# Patient Record
Sex: MALE | Race: WHITE | HISPANIC OR LATINO | Employment: FULL TIME | URBAN - METROPOLITAN AREA
[De-identification: names, ages, dates, MRNs, and addresses within clinical notes are randomized per-mention and may not be internally consistent; named-entity substitution may affect disease eponyms.]

---

## 2019-04-01 ENCOUNTER — HOSPITAL ENCOUNTER (OUTPATIENT)
Dept: URGENT CARE | Facility: CLINIC | Age: 19
Discharge: HOME | End: 2019-04-01
Attending: NURSE PRACTITIONER

## 2019-04-03 LAB — BACTERIA SPEC AEROBE CULT: NORMAL

## 2019-07-24 ENCOUNTER — HOSPITAL ENCOUNTER (OUTPATIENT)
Dept: LAB | Facility: HOSPITAL | Age: 19
Discharge: HOME | End: 2019-07-24

## 2019-07-25 LAB — HBV SURFACE AB SER QL: REACTIVE

## 2020-03-23 ENCOUNTER — HOSPITAL ENCOUNTER (OUTPATIENT)
Dept: MRI IMAGING | Facility: HOSPITAL | Age: 20
Discharge: HOME | End: 2020-03-23
Attending: OPTOMETRIST

## 2020-11-27 ENCOUNTER — HOSPITAL ENCOUNTER (OUTPATIENT)
Dept: OTHER | Facility: HOSPITAL | Age: 20
Discharge: HOME | End: 2020-11-27

## 2020-11-27 LAB
ALBUMIN SERPL-MCNC: 4.9 G/DL (ref 3.5–5)
ALBUMIN/GLOB SERPL: 1.6 {RATIO} (ref 1.4–2.6)
ALP SERPL-CCNC: 135 U/L (ref 53–128)
ALT SERPL-CCNC: 16 U/L (ref 10–40)
ANION GAP SERPL CALC-SCNC: 17 MMOL/L (ref 8–19)
AST SERPL-CCNC: 19 U/L (ref 15–50)
BASOPHILS # BLD AUTO: 0.02 10*3/UL (ref 0–0.2)
BASOPHILS NFR BLD AUTO: 0.5 % (ref 0–3)
BILIRUB SERPL-MCNC: 0.83 MG/DL (ref 0.2–1.3)
BUN SERPL-MCNC: 17 MG/DL (ref 5–25)
BUN/CREAT SERPL: 16 {RATIO} (ref 6–20)
CALCIUM SERPL-MCNC: 10.3 MG/DL (ref 8.7–10.4)
CHLORIDE SERPL-SCNC: 102 MMOL/L (ref 99–111)
CHOLEST SERPL-MCNC: 182 MG/DL (ref 107–200)
CHOLEST/HDLC SERPL: 2.6 {RATIO} (ref 3–6)
CONV ABS IMM GRAN: 0.01 10*3/UL (ref 0–0.2)
CONV CO2: 25 MMOL/L (ref 22–32)
CONV IMMATURE GRAN: 0.2 % (ref 0–1.8)
CONV TOTAL PROTEIN: 8 G/DL (ref 6.3–8.2)
CREAT UR-MCNC: 1.09 MG/DL (ref 0.7–1.2)
DEPRECATED RDW RBC AUTO: 40.5 FL (ref 35.1–43.9)
EOSINOPHIL # BLD AUTO: 0.06 10*3/UL (ref 0–0.7)
EOSINOPHIL # BLD AUTO: 1.5 % (ref 0–7)
ERYTHROCYTE [DISTWIDTH] IN BLOOD BY AUTOMATED COUNT: 11.9 % (ref 11.6–14.4)
EST. AVERAGE GLUCOSE BLD GHB EST-MCNC: 111 MG/DL
GFR SERPLBLD BASED ON 1.73 SQ M-ARVRAT: >60 ML/MIN/{1.73_M2}
GLOBULIN UR ELPH-MCNC: 3.1 G/DL (ref 2–3.5)
GLUCOSE SERPL-MCNC: 101 MG/DL (ref 70–99)
HBA1C MFR BLD: 5.5 % (ref 3.5–5.7)
HCT VFR BLD AUTO: 47.2 % (ref 42–52)
HDLC SERPL-MCNC: 71 MG/DL (ref 40–60)
HGB BLD-MCNC: 16.1 G/DL (ref 14–18)
LDLC SERPL CALC-MCNC: 98 MG/DL (ref 70–100)
LYMPHOCYTES # BLD AUTO: 1.51 10*3/UL (ref 1–5)
LYMPHOCYTES NFR BLD AUTO: 37.4 % (ref 20–45)
MCH RBC QN AUTO: 31.9 PG (ref 27–31)
MCHC RBC AUTO-ENTMCNC: 34.1 G/DL (ref 33–37)
MCV RBC AUTO: 93.5 FL (ref 80–96)
MONOCYTES # BLD AUTO: 0.28 10*3/UL (ref 0.2–1.2)
MONOCYTES NFR BLD AUTO: 6.9 % (ref 3–10)
NEUTROPHILS # BLD AUTO: 2.16 10*3/UL (ref 2–8)
NEUTROPHILS NFR BLD AUTO: 53.5 % (ref 30–85)
NRBC CBCN: 0 % (ref 0–0.7)
OSMOLALITY SERPL CALC.SUM OF ELEC: 290 MOSM/KG (ref 273–304)
PLATELET # BLD AUTO: 175 10*3/UL (ref 130–400)
PMV BLD AUTO: 11.3 FL (ref 9.4–12.4)
POTASSIUM SERPL-SCNC: 4.5 MMOL/L (ref 3.5–5.3)
RBC # BLD AUTO: 5.05 10*6/UL (ref 4.7–6.1)
SODIUM SERPL-SCNC: 139 MMOL/L (ref 135–147)
TRIGL SERPL-MCNC: 64 MG/DL (ref 40–150)
TSH SERPL-ACNC: 1.7 M[IU]/L (ref 0.27–4.2)
VLDLC SERPL-MCNC: 13 MG/DL (ref 5–37)
WBC # BLD AUTO: 4.04 10*3/UL (ref 4.8–10.8)

## 2020-12-26 ENCOUNTER — HOSPITAL ENCOUNTER (OUTPATIENT)
Dept: OTHER | Facility: HOSPITAL | Age: 20
Discharge: HOME | End: 2020-12-26
Attending: INTERNAL MEDICINE

## 2021-01-22 ENCOUNTER — HOSPITAL ENCOUNTER (OUTPATIENT)
Dept: OTHER | Facility: HOSPITAL | Age: 21
Discharge: HOME | End: 2021-01-22
Attending: INTERNAL MEDICINE

## 2021-11-10 ENCOUNTER — HOSPITAL ENCOUNTER (EMERGENCY)
Facility: HOSPITAL | Age: 21
Discharge: HOME | End: 2021-11-10
Attending: EMERGENCY MEDICINE | Admitting: EMERGENCY MEDICINE

## 2021-11-10 ENCOUNTER — APPOINTMENT (OUTPATIENT)
Dept: CT IMAGING | Facility: HOSPITAL | Age: 21
End: 2021-11-10

## 2021-11-10 VITALS
TEMPERATURE: 97.9 F | RESPIRATION RATE: 18 BRPM | OXYGEN SATURATION: 98 % | HEIGHT: 71 IN | SYSTOLIC BLOOD PRESSURE: 114 MMHG | BODY MASS INDEX: 27.01 KG/M2 | DIASTOLIC BLOOD PRESSURE: 69 MMHG | HEART RATE: 68 BPM | WEIGHT: 192.9 LBS

## 2021-11-10 DIAGNOSIS — N20.1 RIGHT URETERAL STONE: Primary | ICD-10-CM

## 2021-11-10 DIAGNOSIS — R10.9 RIGHT FLANK PAIN: ICD-10-CM

## 2021-11-10 DIAGNOSIS — R73.9 ELEVATED BLOOD SUGAR LEVEL: ICD-10-CM

## 2021-11-10 DIAGNOSIS — R93.5 ABNORMAL CT OF THE ABDOMEN: ICD-10-CM

## 2021-11-10 DIAGNOSIS — N23 RENAL COLIC: ICD-10-CM

## 2021-11-10 DIAGNOSIS — R11.2 NON-INTRACTABLE VOMITING WITH NAUSEA: ICD-10-CM

## 2021-11-10 LAB
ALBUMIN SERPL-MCNC: 4.6 G/DL (ref 3.5–5.2)
ALBUMIN/GLOB SERPL: 1.8 G/DL
ALP SERPL-CCNC: 136 U/L (ref 39–117)
ALT SERPL W P-5'-P-CCNC: 14 U/L (ref 1–41)
ANION GAP SERPL CALCULATED.3IONS-SCNC: 12.8 MMOL/L (ref 5–15)
AST SERPL-CCNC: 15 U/L (ref 1–40)
BACTERIA UR QL AUTO: ABNORMAL /HPF
BASOPHILS # BLD AUTO: 0.02 10*3/MM3 (ref 0–0.2)
BASOPHILS NFR BLD AUTO: 0.2 % (ref 0–1.5)
BILIRUB SERPL-MCNC: 0.7 MG/DL (ref 0–1.2)
BILIRUB UR QL STRIP: ABNORMAL
BUN SERPL-MCNC: 17 MG/DL (ref 6–20)
BUN/CREAT SERPL: 15.3 (ref 7–25)
CALCIUM SPEC-SCNC: 8.9 MG/DL (ref 8.6–10.5)
CHLORIDE SERPL-SCNC: 101 MMOL/L (ref 98–107)
CLARITY UR: ABNORMAL
CO2 SERPL-SCNC: 21.2 MMOL/L (ref 22–29)
COLOR UR: ABNORMAL
CREAT SERPL-MCNC: 1.11 MG/DL (ref 0.76–1.27)
DEPRECATED RDW RBC AUTO: 38.9 FL (ref 37–54)
EOSINOPHIL # BLD AUTO: 0.02 10*3/MM3 (ref 0–0.4)
EOSINOPHIL NFR BLD AUTO: 0.2 % (ref 0.3–6.2)
ERYTHROCYTE [DISTWIDTH] IN BLOOD BY AUTOMATED COUNT: 11.8 % (ref 12.3–15.4)
GFR SERPL CREATININE-BSD FRML MDRD: 83 ML/MIN/1.73
GLOBULIN UR ELPH-MCNC: 2.5 GM/DL
GLUCOSE SERPL-MCNC: 142 MG/DL (ref 65–99)
GLUCOSE UR STRIP-MCNC: ABNORMAL MG/DL
HCT VFR BLD AUTO: 41.7 % (ref 37.5–51)
HGB BLD-MCNC: 14.8 G/DL (ref 13–17.7)
HGB UR QL STRIP.AUTO: ABNORMAL
HOLD SPECIMEN: NORMAL
HOLD SPECIMEN: NORMAL
IMM GRANULOCYTES # BLD AUTO: 0.03 10*3/MM3 (ref 0–0.05)
IMM GRANULOCYTES NFR BLD AUTO: 0.3 % (ref 0–0.5)
KETONES UR QL STRIP: NEGATIVE
LEUKOCYTE ESTERASE UR QL STRIP.AUTO: ABNORMAL
LIPASE SERPL-CCNC: 15 U/L (ref 13–60)
LYMPHOCYTES # BLD AUTO: 1.15 10*3/MM3 (ref 0.7–3.1)
LYMPHOCYTES NFR BLD AUTO: 13 % (ref 19.6–45.3)
MCH RBC QN AUTO: 32.4 PG (ref 26.6–33)
MCHC RBC AUTO-ENTMCNC: 35.5 G/DL (ref 31.5–35.7)
MCV RBC AUTO: 91.2 FL (ref 79–97)
MONOCYTES # BLD AUTO: 0.44 10*3/MM3 (ref 0.1–0.9)
MONOCYTES NFR BLD AUTO: 5 % (ref 5–12)
NEUTROPHILS NFR BLD AUTO: 7.19 10*3/MM3 (ref 1.7–7)
NEUTROPHILS NFR BLD AUTO: 81.3 % (ref 42.7–76)
NITRITE UR QL STRIP: NEGATIVE
NRBC BLD AUTO-RTO: 0 /100 WBC (ref 0–0.2)
PH UR STRIP.AUTO: 5.5 [PH] (ref 5–8)
PLATELET # BLD AUTO: 156 10*3/MM3 (ref 140–450)
PMV BLD AUTO: 11.2 FL (ref 6–12)
POTASSIUM SERPL-SCNC: 3.9 MMOL/L (ref 3.5–5.2)
PROT SERPL-MCNC: 7.1 G/DL (ref 6–8.5)
PROT UR QL STRIP: ABNORMAL
RBC # BLD AUTO: 4.57 10*6/MM3 (ref 4.14–5.8)
RBC # UR: ABNORMAL /HPF
REF LAB TEST METHOD: ABNORMAL
SODIUM SERPL-SCNC: 135 MMOL/L (ref 136–145)
SP GR UR STRIP: >=1.03 (ref 1–1.03)
SQUAMOUS #/AREA URNS HPF: ABNORMAL /HPF
UROBILINOGEN UR QL STRIP: ABNORMAL
WBC # BLD AUTO: 8.85 10*3/MM3 (ref 3.4–10.8)
WBC UR QL AUTO: ABNORMAL /HPF
WHOLE BLOOD HOLD SPECIMEN: NORMAL
WHOLE BLOOD HOLD SPECIMEN: NORMAL

## 2021-11-10 PROCEDURE — 25010000002 KETOROLAC TROMETHAMINE PER 15 MG: Performed by: NURSE PRACTITIONER

## 2021-11-10 PROCEDURE — 85025 COMPLETE CBC W/AUTO DIFF WBC: CPT | Performed by: EMERGENCY MEDICINE

## 2021-11-10 PROCEDURE — 81001 URINALYSIS AUTO W/SCOPE: CPT | Performed by: EMERGENCY MEDICINE

## 2021-11-10 PROCEDURE — 80053 COMPREHEN METABOLIC PANEL: CPT | Performed by: EMERGENCY MEDICINE

## 2021-11-10 PROCEDURE — 99283 EMERGENCY DEPT VISIT LOW MDM: CPT

## 2021-11-10 PROCEDURE — 74176 CT ABD & PELVIS W/O CONTRAST: CPT

## 2021-11-10 PROCEDURE — 96374 THER/PROPH/DIAG INJ IV PUSH: CPT

## 2021-11-10 PROCEDURE — 83690 ASSAY OF LIPASE: CPT | Performed by: EMERGENCY MEDICINE

## 2021-11-10 PROCEDURE — 25010000002 ONDANSETRON PER 1 MG: Performed by: NURSE PRACTITIONER

## 2021-11-10 PROCEDURE — 96375 TX/PRO/DX INJ NEW DRUG ADDON: CPT

## 2021-11-10 RX ORDER — ONDANSETRON 2 MG/ML
4 INJECTION INTRAMUSCULAR; INTRAVENOUS ONCE
Status: COMPLETED | OUTPATIENT
Start: 2021-11-10 | End: 2021-11-10

## 2021-11-10 RX ORDER — TAMSULOSIN HYDROCHLORIDE 0.4 MG/1
1 CAPSULE ORAL DAILY
Qty: 7 CAPSULE | Refills: 0 | Status: SHIPPED | OUTPATIENT
Start: 2021-11-10

## 2021-11-10 RX ORDER — KETOROLAC TROMETHAMINE 15 MG/ML
15 INJECTION, SOLUTION INTRAMUSCULAR; INTRAVENOUS ONCE
Status: COMPLETED | OUTPATIENT
Start: 2021-11-10 | End: 2021-11-10

## 2021-11-10 RX ORDER — ONDANSETRON 4 MG/1
4 TABLET, ORALLY DISINTEGRATING ORAL EVERY 8 HOURS PRN
Qty: 15 TABLET | Refills: 0 | Status: SHIPPED | OUTPATIENT
Start: 2021-11-10

## 2021-11-10 RX ORDER — SODIUM CHLORIDE 0.9 % (FLUSH) 0.9 %
10 SYRINGE (ML) INJECTION AS NEEDED
Status: DISCONTINUED | OUTPATIENT
Start: 2021-11-10 | End: 2021-11-10 | Stop reason: HOSPADM

## 2021-11-10 RX ORDER — HYDROCODONE BITARTRATE AND ACETAMINOPHEN 5; 325 MG/1; MG/1
1 TABLET ORAL EVERY 6 HOURS PRN
Qty: 10 TABLET | Refills: 0 | Status: SHIPPED | OUTPATIENT
Start: 2021-11-10

## 2021-11-10 RX ADMIN — ONDANSETRON 4 MG: 2 INJECTION INTRAMUSCULAR; INTRAVENOUS at 08:05

## 2021-11-10 RX ADMIN — KETOROLAC TROMETHAMINE 15 MG: 15 INJECTION, SOLUTION INTRAMUSCULAR; INTRAVENOUS at 08:05

## 2021-11-10 NOTE — ED PROVIDER NOTES
Emergency Department Encounter    Room number: 34/34  Date seen: 11/10/2021  PCP: Provider, No Known      History provided by:  Patient   used: No          HPI:  Chief complaint: right flank pain    Context: Lobo Eli is a 20 y.o. male with a history of kidney stones with lithotripsy (at [facility]) who presents to the ED with right flank pain which started abruptly at 4 AM.  Patient has had nausea, vomiting, and blood in urine. Pain occasionally radiates to right abdomen. He denies fever, cough, chest pain, shortness of breath, abdominal pain, abdominal swelling, testicular pain or swelling, penile rashes or discharge, loss of bowel or bladder control, weakness in lower extremities, saddle paresthesias, or other complaint.  Patient states he does not have a urologist.  Patient denies smoking or alcohol use.      Location: right flank pain  Quality: sharp  Severity: moderate to severe  Radiation: right abdomen  Duration: constant  Onset: 4 am  Modifying factors: nausea, vomiting, blood in urine        Old records reviewed:  No recent ED visits or imaging.     Triage Vitals:  ED Triage Vitals [11/10/21 0713]   Temp Heart Rate Resp BP SpO2   97.9 °F (36.6 °C) 68 18 114/69 98 %      Temp src Heart Rate Source Patient Position BP Location FiO2 (%)   Oral Monitor Sitting Right arm --         Review of Systems   Constitutional: Negative for fatigue and fever.   HENT: Negative for rhinorrhea and sore throat.    Respiratory: Negative for cough and shortness of breath.    Cardiovascular: Negative for chest pain and palpitations.   Gastrointestinal: Positive for nausea and vomiting. Negative for abdominal distention, abdominal pain and diarrhea.   Genitourinary: Positive for hematuria. Negative for dysuria, flank pain, penile discharge, penile pain, scrotal swelling and testicular pain.   Musculoskeletal: Negative for arthralgias and myalgias.   Skin: Negative for rash and wound.   Neurological:  Negative for dizziness and headaches.   Psychiatric/Behavioral: Negative for sleep disturbance and suicidal ideas.         Physical Exam  Constitutional:       Comments: Appears uncomfortable   HENT:      Head: Normocephalic and atraumatic.      Nose: Nose normal.      Mouth/Throat:      Mouth: Mucous membranes are moist.      Pharynx: Oropharynx is clear.   Eyes:      Extraocular Movements: Extraocular movements intact.      Pupils: Pupils are equal, round, and reactive to light.   Cardiovascular:      Rate and Rhythm: Normal rate and regular rhythm.   Pulmonary:      Effort: Pulmonary effort is normal.      Breath sounds: Normal breath sounds.   Abdominal:      General: Abdomen is flat. Bowel sounds are normal. There is no distension.      Palpations: Abdomen is soft.      Tenderness: There is no abdominal tenderness.      Comments: Positive right flank pain, no rashes or wounds.    Musculoskeletal:         General: Normal range of motion.   Skin:     General: Skin is warm and dry.      Coloration: Skin is pale.   Neurological:      General: No focal deficit present.      Mental Status: He is alert and oriented to person, place, and time.   Psychiatric:         Mood and Affect: Mood normal.         Behavior: Behavior normal.         Thought Content: Thought content normal.         Judgment: Judgment normal.             Allergies:  Patient has no known allergies.  Patient's allergies reviewed    Past Medical History:  History reviewed. No pertinent past medical history.      Past Surgical History:  History reviewed. No pertinent surgical history.    Procedures    Labs Reviewed   COMPREHENSIVE METABOLIC PANEL - Abnormal; Notable for the following components:       Result Value    Glucose 142 (*)     Sodium 135 (*)     CO2 21.2 (*)     Alkaline Phosphatase 136 (*)     All other components within normal limits    Narrative:     GFR Normal >60  Chronic Kidney Disease <60  Kidney Failure <15     URINALYSIS W/  MICROSCOPIC IF INDICATED (NO CULTURE) - Abnormal; Notable for the following components:    Color, UA Orange (*)     Appearance, UA Turbid (*)     Bilirubin, UA Small (1+) (*)     Blood, UA Moderate (2+) (*)     Leuk Esterase, UA Small (1+) (*)     All other components within normal limits   CBC WITH AUTO DIFFERENTIAL - Abnormal; Notable for the following components:    RDW 11.8 (*)     Neutrophil % 81.3 (*)     Lymphocyte % 13.0 (*)     Eosinophil % 0.2 (*)     Neutrophils, Absolute 7.19 (*)     All other components within normal limits   URINALYSIS, MICROSCOPIC ONLY - Abnormal; Notable for the following components:    RBC, UA Too Numerous to Count (*)     WBC, UA 0-2 (*)     All other components within normal limits   LIPASE - Normal   RAINBOW DRAW    Narrative:     The following orders were created for panel order [place] Draw.  Procedure                               Abnormality         Status                     ---------                               -----------         ------                     Green Top (Gel)[[id number]]                                  Final result               Lavender Top[[id number]]                                     Final result               Gold Top - SST[[id number]]                                   Final result               Light Blue Top[[id number]]                                   Final result                 Please view results for these tests on the individual orders.   CBC AND DIFFERENTIAL    Narrative:     The following orders were created for panel order CBC & Differential.  Procedure                               Abnormality         Status                     ---------                               -----------         ------                     CBC Auto Differential[[id number]]        Abnormal            Final result                 Please view results for these tests on the individual orders.   GREEN TOP   LAVENDER TOP   GOLD TOP - SST   LIGHT BLUE TOP       CT Abdomen  Pelvis Without Contrast    Result Date: 11/10/2021  Narrative: PROCEDURE: CT ABDOMEN PELVIS WO CONTRAST  COMPARISON: [facility], CT, ABD/PELVIS STONE PROTOCOL W/O, 8/02/2009, 5:06.  INDICATIONS: GENERALIZED BILATERAL FLANK PAIN WITH GROSS HEMATURIA  TECHNIQUE: CT images were created without intravenous contrast.   PROTOCOL:   Standard imaging protocol performed    RADIATION:   DLP: 495.4mGy*cm   Automated exposure control was utilized to minimize radiation dose.  FINDINGS:  Lung bases are clear.  No upper abdominal finding.  There appears to be a duplicated or partially duplicated collecting system.  There is appears separate is difficult to be certain if they joint distally.  No significant nephrolithiasis  There is a 3 millimeter stone in the distal right ureter arising from the upper pole moiety is with minimal any obstructive uropathy.  No bladder stones are evident.  No renal lesions are seen.  No evidence for appendicitis.  No small bowel finding is seen.  Normal-appearing aorta.  No adenopathy.  No significant ascites.  No acute osseous finding.  There are few prominent lymph nodes in the right lower quadrant.  CONCLUSION:  1. 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  Two ureters arise from the right collecting system and continue to at least the mid ureter.  It is difficult to be certain if the ureters few distally. 2. 3 millimeter stone in the distal right ureter that arises from the upper pole moiety.  Very mild if any obstructive uropathy present. 3. No other acute finding.     DIEGO RIVERA MD       Electronically Signed and Approved By: DIEGO RIVERA MD on 11/10/2021 at 8:28               Medications   sodium chloride 0.9 % flush 10 mL (has no administration in time range)   ketorolac (TORADOL) injection 15 mg (15 mg Intravenous Given 11/10/21 0805)   ondansetron (ZOFRAN) injection 4 mg (4 mg Intravenous Given 11/10/21 0805)          Progress Notes:  0815 Patient rechecked. States pain is much improved now and minimal at 2/10. Discussed elevated glucose level and need for follow up. He verbalized understanding.     0850 I have personally reviewed all radiology findings, incidental and otherwise, with the patient and made patient aware of what needs to be followed up with PCP. He is aware of duplicating renal system on right    0902 I have discussed with the patient the emergency department work-up including all test results, the differential diagnosis, the diagnosis given in the emergency department, and the absolute need for follow-up with the primary care physician.  I have discussed all prescriptions and treatments.  I have discussed the possible worsening of their condition, and also discussed criteria for return to the emergency department which includes but is not limited to worsening condition or lack of improvement of the current condition.  I have informed them that a normal work-up evaluation in the emergency department and/or discharge from the emergency department, does not signify that no disease process is present, but simply that there is no emergent indication for admission.  All questions were answered at this time.  I informed them that significant pathology may still be present, but they may need further work-up, and that this further investigation should be undertaken by the primary care physician. He voiced his/her understanding.  Patient is agreeable to plan for discharge.    Discharge instructions include:   Follow up with any PCP on the list provided to establish primary care and have your blood pressure rechecked.    Follow-up with Dr. Lucero, urology within 1 week or earlier if symptoms worsen.  Call for an appointment.    Use urine strainer as directed.    Take Zofran, Flomax, and Lortab as directed.    Tab is a narcotic pain reliever only use when absolutely necessary.  Narcotics can be addictive.  Do not  "take Tylenol or alcohol or take this medication.  Do not share with friends or family      Follow up with PCP to have your sugar rechecked and possible hemoglobin AIC      Return to Emergency department for fever, pain with urination, testicular pain or swelling, unable to urinate, new change or worsening symptoms.            Vitals:    11/10/21 0713   BP: 114/69   BP Location: Right arm   Patient Position: Sitting   Pulse: 68   Resp: 18   Temp: 97.9 °F (36.6 °C)   TempSrc: Oral   SpO2: 98%   Weight: 87.5 kg (192 lb 14.4 oz)   Height: 180.3 cm (71\")           Final diagnoses:   Elevated blood sugar level   Right ureteral stone   Right flank pain   Non-intractable vomiting with nausea   Renal colic   Abnormal CT of the abdomen (duplicate renal collecting system, right)       Prescriptions:        Medication List      START taking these medications    HYDROcodone-acetaminophen 5-325 MG per tablet  Commonly known as: NORCO  Take 1 tablet by mouth Every 6 (Six) Hours As Needed for Moderate Pain .     ondansetron ODT 4 MG disintegrating tablet  Commonly known as: ZOFRAN-ODT  Place 1 tablet on the tongue Every 8 (Eight) Hours As Needed for Nausea or Vomiting.     tamsulosin 0.4 MG capsule 24 hr capsule  Commonly known as: FLOMAX  Take 1 capsule by mouth Daily. May discontinue when pass stone           Where to Get Your Medications      These medications were sent to [facility] [address] [place] [address]    Hours: Mon-Fri 9:00AM-7:30PM Saturday 9:00AM-2:00PM Phone: [phone]   · HYDROcodone-acetaminophen 5-325 MG per tablet  · ondansetron ODT 4 MG disintegrating tablet  · tamsulosin 0.4 MG capsule 24 hr capsule                 MDM  Number of Diagnoses or Management Options     Amount and/or Complexity of Data Reviewed  Clinical lab tests: ordered and reviewed  Tests in the medicine section of CPT®: ordered and reviewed  Independent visualization of images, tracings, or specimens: " yes    Risk of Complications, Morbidity, and/or Mortality  Presenting problems: moderate  Diagnostic procedures: moderate  Management options: moderate    Patient Progress  Patient progress: stable      (N20.1) Right ureteral stone - Plan: HYDROcodone-acetaminophen (NORCO) 5-325 MG per tablet    (R73.9) Elevated blood sugar level    (R10.9) Right flank pain    (R11.2) Non-intractable vomiting with nausea    (N23) Renal colic    (R93.5) Abnormal CT of the abdomen (duplicate renal collecting system, right)      Reviewing your test results and medical records in your chart is not a substitution for discussing these with your health care provider.  Please contact your primary care provider to discuss any questions or concerns you may have regarding these test results.      Part of this note may be an electronic transcription/translation of spoken language to printed text using the Dragon Dictation System.  The electronic translation of spoken language may permit erroneous or at times nonsensical words or phrases to be inadvertently transcribed.  Although I have reviewed the note for such errors some may still exist.             Tavia Travis, APRN  11/10/21 0915

## 2021-11-10 NOTE — DISCHARGE INSTRUCTIONS
Follow up with any PCP on the list provided to establish primary care and have your blood pressure rechecked.    Follow-up with Dr. Lucero, urology within 1 week or earlier if symptoms worsen.  Call for an appointment.    Use urine strainer as directed.    Take Zofran, Flomax, and Lortab as directed.    Tab is a narcotic pain reliever only use when absolutely necessary.  Narcotics can be addictive.  Do not take Tylenol or alcohol or take this medication.  Do not share with friends or family    Follow up with PCP to have your sugar rechecked and possible hemoglobin AIC    Return to Emergency department for fever, pain with urination, testicular pain or swelling, unable to urinate, new change or worsening symptoms.

## 2025-04-30 ENCOUNTER — OFFICE VISIT (OUTPATIENT)
Dept: FAMILY MEDICINE CLINIC | Facility: CLINIC | Age: 25
End: 2025-04-30
Payer: COMMERCIAL

## 2025-04-30 VITALS
HEIGHT: 71 IN | TEMPERATURE: 98.5 F | HEART RATE: 81 BPM | BODY MASS INDEX: 26.88 KG/M2 | DIASTOLIC BLOOD PRESSURE: 78 MMHG | WEIGHT: 192 LBS | OXYGEN SATURATION: 97 % | SYSTOLIC BLOOD PRESSURE: 138 MMHG

## 2025-04-30 DIAGNOSIS — R74.8 ELEVATED ALKALINE PHOSPHATASE LEVEL: ICD-10-CM

## 2025-04-30 DIAGNOSIS — E78.5 HYPERLIPIDEMIA, UNSPECIFIED HYPERLIPIDEMIA TYPE: Primary | ICD-10-CM

## 2025-04-30 DIAGNOSIS — Z11.59 NEED FOR HEPATITIS C SCREENING TEST: ICD-10-CM

## 2025-04-30 DIAGNOSIS — R73.09 HIGH GLUCOSE: ICD-10-CM

## 2025-04-30 LAB
ALBUMIN SERPL-MCNC: 4.8 G/DL (ref 3.5–5.2)
ALBUMIN/GLOB SERPL: 1.5 G/DL
ALP SERPL-CCNC: 143 U/L (ref 39–117)
ALT SERPL W P-5'-P-CCNC: 22 U/L (ref 1–41)
ANION GAP SERPL CALCULATED.3IONS-SCNC: 11.5 MMOL/L (ref 5–15)
AST SERPL-CCNC: 27 U/L (ref 1–40)
BASOPHILS # BLD AUTO: 0.02 10*3/MM3 (ref 0–0.2)
BASOPHILS NFR BLD AUTO: 0.3 % (ref 0–1.5)
BILIRUB SERPL-MCNC: 0.5 MG/DL (ref 0–1.2)
BUN SERPL-MCNC: 14 MG/DL (ref 6–20)
BUN/CREAT SERPL: 13.9 (ref 7–25)
CALCIUM SPEC-SCNC: 9.8 MG/DL (ref 8.6–10.5)
CHLORIDE SERPL-SCNC: 103 MMOL/L (ref 98–107)
CHOLEST SERPL-MCNC: 172 MG/DL (ref 0–200)
CO2 SERPL-SCNC: 25.5 MMOL/L (ref 22–29)
CREAT SERPL-MCNC: 1.01 MG/DL (ref 0.76–1.27)
DEPRECATED RDW RBC AUTO: 41.5 FL (ref 37–54)
EGFRCR SERPLBLD CKD-EPI 2021: 105.2 ML/MIN/1.73
EOSINOPHIL # BLD AUTO: 0.12 10*3/MM3 (ref 0–0.4)
EOSINOPHIL NFR BLD AUTO: 2 % (ref 0.3–6.2)
ERYTHROCYTE [DISTWIDTH] IN BLOOD BY AUTOMATED COUNT: 11.9 % (ref 12.3–15.4)
GLOBULIN UR ELPH-MCNC: 3.1 GM/DL
GLUCOSE SERPL-MCNC: 74 MG/DL (ref 65–99)
HBA1C MFR BLD: 5.1 % (ref 4.8–5.6)
HCT VFR BLD AUTO: 46.1 % (ref 37.5–51)
HCV AB SER QL: NORMAL
HDLC SERPL-MCNC: 57 MG/DL (ref 40–60)
HGB BLD-MCNC: 16 G/DL (ref 13–17.7)
IMM GRANULOCYTES # BLD AUTO: 0.02 10*3/MM3 (ref 0–0.05)
IMM GRANULOCYTES NFR BLD AUTO: 0.3 % (ref 0–0.5)
LDLC SERPL CALC-MCNC: 97 MG/DL (ref 0–100)
LDLC/HDLC SERPL: 1.67 {RATIO}
LYMPHOCYTES # BLD AUTO: 1.91 10*3/MM3 (ref 0.7–3.1)
LYMPHOCYTES NFR BLD AUTO: 31.5 % (ref 19.6–45.3)
MCH RBC QN AUTO: 32.6 PG (ref 26.6–33)
MCHC RBC AUTO-ENTMCNC: 34.7 G/DL (ref 31.5–35.7)
MCV RBC AUTO: 93.9 FL (ref 79–97)
MONOCYTES # BLD AUTO: 0.51 10*3/MM3 (ref 0.1–0.9)
MONOCYTES NFR BLD AUTO: 8.4 % (ref 5–12)
NEUTROPHILS NFR BLD AUTO: 3.49 10*3/MM3 (ref 1.7–7)
NEUTROPHILS NFR BLD AUTO: 57.5 % (ref 42.7–76)
NRBC BLD AUTO-RTO: 0 /100 WBC (ref 0–0.2)
PLATELET # BLD AUTO: 173 10*3/MM3 (ref 140–450)
PMV BLD AUTO: 12.5 FL (ref 6–12)
POTASSIUM SERPL-SCNC: 4.2 MMOL/L (ref 3.5–5.2)
PROT SERPL-MCNC: 7.9 G/DL (ref 6–8.5)
RBC # BLD AUTO: 4.91 10*6/MM3 (ref 4.14–5.8)
SODIUM SERPL-SCNC: 140 MMOL/L (ref 136–145)
TRIGL SERPL-MCNC: 98 MG/DL (ref 0–150)
TSH SERPL DL<=0.05 MIU/L-ACNC: 1.19 UIU/ML (ref 0.27–4.2)
VLDLC SERPL-MCNC: 18 MG/DL (ref 5–40)
WBC NRBC COR # BLD AUTO: 6.07 10*3/MM3 (ref 3.4–10.8)

## 2025-04-30 PROCEDURE — 82977 ASSAY OF GGT: CPT | Performed by: STUDENT IN AN ORGANIZED HEALTH CARE EDUCATION/TRAINING PROGRAM

## 2025-04-30 PROCEDURE — 80050 GENERAL HEALTH PANEL: CPT | Performed by: STUDENT IN AN ORGANIZED HEALTH CARE EDUCATION/TRAINING PROGRAM

## 2025-04-30 PROCEDURE — 86803 HEPATITIS C AB TEST: CPT | Performed by: STUDENT IN AN ORGANIZED HEALTH CARE EDUCATION/TRAINING PROGRAM

## 2025-04-30 PROCEDURE — 83036 HEMOGLOBIN GLYCOSYLATED A1C: CPT | Performed by: STUDENT IN AN ORGANIZED HEALTH CARE EDUCATION/TRAINING PROGRAM

## 2025-04-30 PROCEDURE — 80061 LIPID PANEL: CPT | Performed by: STUDENT IN AN ORGANIZED HEALTH CARE EDUCATION/TRAINING PROGRAM

## 2025-04-30 NOTE — PROGRESS NOTES
"Chief Complaint  Annual Exam, Establish Care (Wrist pain new pt ), and Wrist Pain    Subjective      Lobo Eli is a 24 y.o. male who presents to [facility]     Patient comes to establish care. Pmh of kidney stone, lazy eye, patient in general is healthy 23 yo male. Will obtain labs and reevel with results.     Objective   Vital Signs:   Vitals:    04/30/25 1420 04/30/25 1430   BP: 155/82 138/78   Pulse: 81    Temp: 98.5 °F (36.9 °C)    TempSrc: Temporal    SpO2: 97%    Weight: 87.1 kg (192 lb)    Height: 180.3 cm (70.98\")      Body mass index is 26.79 kg/m².    Wt Readings from Last 3 Encounters:   04/30/25 87.1 kg (192 lb)   11/10/21 87.5 kg (192 lb 14.4 oz)     BP Readings from Last 3 Encounters:   04/30/25 138/78   11/10/21 114/69       Health Maintenance   Topic Date Due    TDAP/TD VACCINES (2 - Td or Tdap) 06/08/2020    HEPATITIS C SCREENING  Never done    ANNUAL PHYSICAL  Never done    INFLUENZA VACCINE  07/01/2025    COVID-19 Vaccine  Completed    Pneumococcal Vaccine 0-49  Aged Out       Physical Exam  Vitals reviewed.   HENT:      Head: Normocephalic.      Mouth/Throat:      Mouth: Mucous membranes are moist.   Eyes:      Pupils: Pupils are equal, round, and reactive to light.   Cardiovascular:      Rate and Rhythm: Normal rate.   Abdominal:      General: Abdomen is flat.   Musculoskeletal:         General: Normal range of motion.      Cervical back: Normal range of motion.   Skin:     General: Skin is warm.      Capillary Refill: Capillary refill takes less than 2 seconds.   Neurological:      Mental Status: He is alert.            Assessment & Plan  Hyperlipidemia, unspecified hyperlipidemia type  Obtain lipid panel     Orders:    TSH    Comprehensive Metabolic Panel    Lipid Panel    CBC & Differential    Need for hepatitis C screening test    Orders:    Hepatitis C antibody; Future         BMI is >= 25 and <30. (Overweight) The following options were offered after " discussion;: weight loss educational material (shared in after visit summary), exercise counseling/recommendations, and nutrition counseling/recommendations     I spent 20 minutes caring for Lobo on this date of service. This time includes time spent by me in the following activities:preparing for the visit, reviewing tests, obtaining and/or reviewing a separately obtained history, performing a medically appropriate examination and/or evaluation, counseling and educating the patient/family/caregiver, ordering medications, tests, or procedures, referring and communicating with other health care professionals, documenting information in the medical record, independently interpreting results and communicating that information with the patient/family/caregiver, care coordination.    FOLLOW UP  No follow-ups on file.  Patient was given instructions and counseling regarding his condition or for health maintenance advice. Please see specific information pulled into the AVS if appropriate.       Pedro Munoz MD  04/30/25  14:31 EDT    CURRENT & DISCONTINUED MEDICATIONS  No current outpatient medications    Medications Discontinued During This Encounter   Medication Reason    HYDROcodone-acetaminophen (NORCO) 5-325 MG per tablet *Therapy completed    ondansetron ODT (ZOFRAN-ODT) 4 MG disintegrating tablet *Therapy completed    tamsulosin (FLOMAX) 0.4 MG capsule 24 hr capsule *Therapy completed

## 2025-05-01 LAB — GGT SERPL-CCNC: 19 U/L (ref 8–61)

## 2025-05-08 ENCOUNTER — PATIENT ROUNDING (BHMG ONLY) (OUTPATIENT)
Dept: FAMILY MEDICINE CLINIC | Facility: CLINIC | Age: 25
End: 2025-05-08
Payer: COMMERCIAL

## 2025-05-08 NOTE — PROGRESS NOTES
A LIQVID MESSAGE HAS BEEN SENT TO THE PATIENT FOR PATIENT ROUNDING WITH [facility]